# Patient Record
Sex: FEMALE | URBAN - METROPOLITAN AREA
[De-identification: names, ages, dates, MRNs, and addresses within clinical notes are randomized per-mention and may not be internally consistent; named-entity substitution may affect disease eponyms.]

---

## 2018-07-20 ENCOUNTER — IMPORTED ENCOUNTER (OUTPATIENT)
Dept: URBAN - METROPOLITAN AREA CLINIC 1 | Facility: CLINIC | Age: 43
End: 2018-07-20

## 2018-07-20 PROBLEM — H10.45: Noted: 2018-07-20

## 2018-07-20 PROBLEM — H01.004: Noted: 2018-07-20

## 2018-07-20 PROBLEM — H25.13: Noted: 2018-07-20

## 2018-07-20 PROBLEM — H01.005: Noted: 2018-07-20

## 2018-07-20 PROBLEM — H01.002: Noted: 2018-07-20

## 2018-07-20 PROBLEM — H01.001: Noted: 2018-07-20

## 2018-07-20 PROCEDURE — 92002 INTRM OPH EXAM NEW PATIENT: CPT

## 2018-07-20 NOTE — PATIENT DISCUSSION
1.  Allergic Conjunctivitis OU -- The condition was  discussed with the patient. Avoidance of allergens and cool compresses were recommended. Begin Tobradex TID OU until seen back (ERx'd). Recommend the use of OTC Zaditor BID OU PRN Itching. Instilled 1 drop of Pazeo & Durezol OU now. 2.  Anterior Blepharitis OU -- Daily warm compresses and lid scrubs were recommended. 3. Cataract OU: Observe for now without intervention. The patient was advised to contact us if any change or worsening of visionLetter to Dr. Jaspal Woodson. Return for an appointment in 3 WK for a 30 (Allergic Conjunctivitis Recheck OU) with Dr. Mirta Sky.

## 2018-08-06 ENCOUNTER — IMPORTED ENCOUNTER (OUTPATIENT)
Dept: URBAN - METROPOLITAN AREA CLINIC 1 | Facility: CLINIC | Age: 43
End: 2018-08-06

## 2018-08-06 PROBLEM — H25.13: Noted: 2018-08-06

## 2018-08-06 PROBLEM — H01.004: Noted: 2018-08-06

## 2018-08-06 PROBLEM — H01.001: Noted: 2018-08-06

## 2018-08-06 PROBLEM — H10.45: Noted: 2018-08-06

## 2018-08-06 PROCEDURE — 92015 DETERMINE REFRACTIVE STATE: CPT

## 2018-08-06 PROCEDURE — 92014 COMPRE OPH EXAM EST PT 1/>: CPT

## 2018-08-06 NOTE — PATIENT DISCUSSION
1.  Allergic Conjunctivitis OU -- Improving. Patient to d/c TD OU and to continue with OTC Zaditor OU BID Routinely. 2. Posterior Blepharitis OU - Daily warm compresses and lid scrubs were recommended. 3. Cataract OU: Observe for now without intervention. The patient was advised to contact us if any change or worsening of vision4. Return for an appointment for 1yr/30 with Dr. Brandon Frey.

## 2019-05-02 ENCOUNTER — IMPORTED ENCOUNTER (OUTPATIENT)
Dept: URBAN - METROPOLITAN AREA CLINIC 1 | Facility: CLINIC | Age: 44
End: 2019-05-02

## 2019-05-02 PROBLEM — B30.9: Noted: 2019-05-02

## 2019-05-02 PROBLEM — H01.004: Noted: 2019-05-02

## 2019-05-02 PROBLEM — H01.002: Noted: 2019-05-02

## 2019-05-02 PROBLEM — H25.13: Noted: 2019-05-02

## 2019-05-02 PROBLEM — H01.001: Noted: 2019-05-02

## 2019-05-02 PROBLEM — H01.005: Noted: 2019-05-02

## 2019-05-02 PROCEDURE — 92012 INTRM OPH EXAM EST PATIENT: CPT

## 2019-05-02 NOTE — PATIENT DISCUSSION
1.  Viral Conjunctivitis OU -- The condition was discussed with the patient. Cool compresses and artificial tears were recommended. The mechanism of transmission was explained and the patient was educated to wash hands and use separate towels and bedding. Begin Pred Forte TID OU **Shake Well** (ERx'd). Recommend increase the frequent use of OTC PF AT's QID-Q1H OU Routinely (Sample of Refresh Optive Advanced PF Given). 2. Blepharitis OU -- Recommend Hot Moist Compresses and Lid Scrubs / Baby Shampoo QHS OU PRN. 3. Nuclear Cataracts OU -- Observe for now without intervention. The patient was advised to contact us if any change or worsening of vision. 4. Allergic Conjunctivitis OU -- Hold on the use of OTC Zaditor OU. Return for an appointment in 1 WK for a 10 OU (Viral Conjunctivitis Recheck OU) with Dr. Apolonia Avalos.

## 2019-05-09 ENCOUNTER — IMPORTED ENCOUNTER (OUTPATIENT)
Dept: URBAN - METROPOLITAN AREA CLINIC 1 | Facility: CLINIC | Age: 44
End: 2019-05-09

## 2019-05-09 PROBLEM — H01.001: Noted: 2019-05-09

## 2019-05-09 PROBLEM — H10.45: Noted: 2019-05-09

## 2019-05-09 PROBLEM — H25.13: Noted: 2019-05-09

## 2019-05-09 PROBLEM — B30.9: Noted: 2019-05-09

## 2019-05-09 PROBLEM — H01.004: Noted: 2019-05-09

## 2019-05-09 PROCEDURE — 92012 INTRM OPH EXAM EST PATIENT: CPT

## 2019-05-09 NOTE — PATIENT DISCUSSION
1.  Viral Conjunctivitis OU -- Improving. Continue Pred Forte. Decrease dose to BID OU x 1 week Qdaily x 1 week then d/c. Recommend continue the frequent use of OTC PF AT's QID-Q1H OU Routinely. Cool compresses and artificial tears were recommended. The mechanism of transmission was explained and the patient was educated to wash hands and use separate towels and bedding. 2. Anterior Blepharitis OU -- Recommend Hot Moist Compresses and Lid Scrubs / Baby Shampoo QHS OU PRN. 3. Cataracts OU -- Observe for now without intervention. The patient was advised to contact us if any change or worsening of vision. 4. Allergic Conjunctivitis OU -- May resume the use of OTC Zaditor PRN OU after d/c Pred and Viral Conjunctivitis is resolved. 5.  R.A. -- Per patient currently not on Plaquenil. Return for an appointment in August for a 27 with Dr. Crispin Wright.

## 2022-04-02 ASSESSMENT — TONOMETRY
OS_IOP_MMHG: 10
OS_IOP_MMHG: 21
OD_IOP_MMHG: 13
OD_IOP_MMHG: 10
OD_IOP_MMHG: 11
OS_IOP_MMHG: 11
OS_IOP_MMHG: 11
OD_IOP_MMHG: 11

## 2022-04-02 ASSESSMENT — VISUAL ACUITY
OD_PH: CC 20/40
OS_PH: CC 20/30
OS_SC: 20/30
OD_SC: 20/40
OD_SC: 20/30
OS_SC: 20/25
OS_SC: 20/50
OD_SC: 20/40
OD_SC: 20/30
OS_SC: 20/25

## 2025-05-20 ENCOUNTER — TRANSCRIBE ORDERS (OUTPATIENT)
Facility: HOSPITAL | Age: 50
End: 2025-05-20

## 2025-05-20 DIAGNOSIS — Z12.31 ENCOUNTER FOR SCREENING MAMMOGRAM FOR MALIGNANT NEOPLASM OF BREAST: Primary | ICD-10-CM
